# Patient Record
Sex: FEMALE | Race: WHITE | NOT HISPANIC OR LATINO | URBAN - NONMETROPOLITAN AREA
[De-identification: names, ages, dates, MRNs, and addresses within clinical notes are randomized per-mention and may not be internally consistent; named-entity substitution may affect disease eponyms.]

---

## 2019-10-29 ENCOUNTER — IMPORTED ENCOUNTER (OUTPATIENT)
Dept: URBAN - NONMETROPOLITAN AREA CLINIC 1 | Facility: CLINIC | Age: 69
End: 2019-10-29

## 2019-10-29 PROCEDURE — 92310 CONTACT LENS FITTING OU: CPT

## 2019-10-29 PROCEDURE — 92015 DETERMINE REFRACTIVE STATE: CPT

## 2019-10-29 PROCEDURE — 92004 COMPRE OPH EXAM NEW PT 1/>: CPT

## 2019-10-29 NOTE — PATIENT DISCUSSION
Cataract OD-  discussed findings w/patient-  no treatment indicated at this time -  UV protection recommended-  monitor 6 month f/u Cataract OD w/BAT Pseudophakia OS-  discussed findings w/patient-  no PCO noted at this time-  monitor as scheduled or prn Compound Myopic Astigmatism OU w/Presbyopia-  discussed findings w/patient-  new spectacle/CL Rx issued-  monitor yearly or prn; 's Notes: MR 10/30/2019DFE 10/30/2019

## 2019-10-30 PROBLEM — Z96.1: Noted: 2019-10-29

## 2019-10-30 PROBLEM — H25.11: Noted: 2019-10-29

## 2019-10-30 PROBLEM — H52.13: Noted: 2019-10-30

## 2019-10-30 PROBLEM — H52.223: Noted: 2019-10-30

## 2019-10-30 PROBLEM — H52.4: Noted: 2019-10-30

## 2022-04-09 ASSESSMENT — VISUAL ACUITY
OS_SC: 20/70
OD_CC: 20/80 CL
OD_GLARE: 20/200+
OU_SC: 20/20
OS_CC: 20/400

## 2022-04-09 ASSESSMENT — TONOMETRY
OS_IOP_MMHG: 20
OD_IOP_MMHG: 22